# Patient Record
Sex: MALE | Race: WHITE | NOT HISPANIC OR LATINO | URBAN - METROPOLITAN AREA
[De-identification: names, ages, dates, MRNs, and addresses within clinical notes are randomized per-mention and may not be internally consistent; named-entity substitution may affect disease eponyms.]

---

## 2017-04-23 ENCOUNTER — INPATIENT (INPATIENT)
Facility: HOSPITAL | Age: 72
LOS: 0 days | Discharge: HOME | End: 2017-04-24
Attending: INTERNAL MEDICINE | Admitting: INTERNAL MEDICINE
Payer: MEDICARE

## 2017-04-23 VITALS — HEIGHT: 67 IN | WEIGHT: 132.06 LBS

## 2017-04-23 LAB
ALBUMIN SERPL ELPH-MCNC: 4 G/DL — SIGNIFICANT CHANGE UP (ref 3.3–5)
ALP SERPL-CCNC: 62 U/L — SIGNIFICANT CHANGE UP (ref 40–120)
ALT FLD-CCNC: 38 U/L — SIGNIFICANT CHANGE UP (ref 12–78)
ANION GAP SERPL CALC-SCNC: 7 MMOL/L — SIGNIFICANT CHANGE UP (ref 5–17)
APPEARANCE UR: CLEAR — SIGNIFICANT CHANGE UP
APTT BLD: 34.9 SEC — SIGNIFICANT CHANGE UP (ref 27.5–37.4)
AST SERPL-CCNC: 22 U/L — SIGNIFICANT CHANGE UP (ref 15–37)
BASOPHILS # BLD AUTO: 0 K/UL — SIGNIFICANT CHANGE UP (ref 0–0.2)
BASOPHILS NFR BLD AUTO: 0.4 % — SIGNIFICANT CHANGE UP (ref 0–2)
BILIRUB SERPL-MCNC: 0.5 MG/DL — SIGNIFICANT CHANGE UP (ref 0.2–1.2)
BILIRUB UR-MCNC: NEGATIVE — SIGNIFICANT CHANGE UP
BUN SERPL-MCNC: 15 MG/DL — SIGNIFICANT CHANGE UP (ref 7–23)
CALCIUM SERPL-MCNC: 9.6 MG/DL — SIGNIFICANT CHANGE UP (ref 8.5–10.1)
CHLORIDE SERPL-SCNC: 105 MMOL/L — SIGNIFICANT CHANGE UP (ref 96–108)
CO2 SERPL-SCNC: 27 MMOL/L — SIGNIFICANT CHANGE UP (ref 22–31)
COLOR SPEC: YELLOW — SIGNIFICANT CHANGE UP
CREAT SERPL-MCNC: 0.94 MG/DL — SIGNIFICANT CHANGE UP (ref 0.5–1.3)
DIFF PNL FLD: NEGATIVE — SIGNIFICANT CHANGE UP
EOSINOPHIL # BLD AUTO: 0 K/UL — SIGNIFICANT CHANGE UP (ref 0–0.5)
EOSINOPHIL NFR BLD AUTO: 0.2 % — SIGNIFICANT CHANGE UP (ref 0–6)
GLUCOSE SERPL-MCNC: 112 MG/DL — HIGH (ref 70–99)
GLUCOSE UR QL: NEGATIVE MG/DL — SIGNIFICANT CHANGE UP
HCT VFR BLD CALC: 39 % — SIGNIFICANT CHANGE UP (ref 39–50)
HGB BLD-MCNC: 12.6 G/DL — LOW (ref 13–17)
INR BLD: 1.09 RATIO — SIGNIFICANT CHANGE UP (ref 0.88–1.16)
KETONES UR-MCNC: NEGATIVE — SIGNIFICANT CHANGE UP
LEUKOCYTE ESTERASE UR-ACNC: NEGATIVE — SIGNIFICANT CHANGE UP
LYMPHOCYTES # BLD AUTO: 1.1 K/UL — SIGNIFICANT CHANGE UP (ref 1–3.3)
LYMPHOCYTES # BLD AUTO: 11.6 % — LOW (ref 13–44)
MCHC RBC-ENTMCNC: 28.1 PG — SIGNIFICANT CHANGE UP (ref 27–34)
MCHC RBC-ENTMCNC: 32.2 GM/DL — SIGNIFICANT CHANGE UP (ref 32–36)
MCV RBC AUTO: 87.2 FL — SIGNIFICANT CHANGE UP (ref 80–100)
MONOCYTES # BLD AUTO: 0.4 K/UL — SIGNIFICANT CHANGE UP (ref 0–0.9)
MONOCYTES NFR BLD AUTO: 3.7 % — SIGNIFICANT CHANGE UP (ref 2–14)
NEUTROPHILS # BLD AUTO: 8.2 K/UL — HIGH (ref 1.8–7.4)
NEUTROPHILS NFR BLD AUTO: 84.1 % — HIGH (ref 43–77)
NITRITE UR-MCNC: NEGATIVE — SIGNIFICANT CHANGE UP
PH UR: 6 — SIGNIFICANT CHANGE UP (ref 5–8)
PLATELET # BLD AUTO: 309 K/UL — SIGNIFICANT CHANGE UP (ref 150–400)
POTASSIUM SERPL-MCNC: 4.2 MMOL/L — SIGNIFICANT CHANGE UP (ref 3.5–5.3)
POTASSIUM SERPL-SCNC: 4.2 MMOL/L — SIGNIFICANT CHANGE UP (ref 3.5–5.3)
PROT SERPL-MCNC: 7.2 GM/DL — SIGNIFICANT CHANGE UP (ref 6–8.3)
PROT UR-MCNC: NEGATIVE MG/DL — SIGNIFICANT CHANGE UP
PROTHROM AB SERPL-ACNC: 11.8 SEC — SIGNIFICANT CHANGE UP (ref 9.8–12.7)
RBC # BLD: 4.47 M/UL — SIGNIFICANT CHANGE UP (ref 4.2–5.8)
RBC # FLD: 12.7 % — SIGNIFICANT CHANGE UP (ref 10.3–14.5)
SODIUM SERPL-SCNC: 139 MMOL/L — SIGNIFICANT CHANGE UP (ref 135–145)
SP GR SPEC: 1.01 — SIGNIFICANT CHANGE UP (ref 1.01–1.02)
UROBILINOGEN FLD QL: NEGATIVE MG/DL — SIGNIFICANT CHANGE UP
WBC # BLD: 9.8 K/UL — SIGNIFICANT CHANGE UP (ref 3.8–10.5)
WBC # FLD AUTO: 9.8 K/UL — SIGNIFICANT CHANGE UP (ref 3.8–10.5)

## 2017-04-23 PROCEDURE — 93010 ELECTROCARDIOGRAM REPORT: CPT

## 2017-04-23 PROCEDURE — 70450 CT HEAD/BRAIN W/O DYE: CPT | Mod: 26

## 2017-04-23 PROCEDURE — 73080 X-RAY EXAM OF ELBOW: CPT | Mod: 26,RT

## 2017-04-23 PROCEDURE — 72125 CT NECK SPINE W/O DYE: CPT | Mod: 26

## 2017-04-23 PROCEDURE — 74176 CT ABD & PELVIS W/O CONTRAST: CPT | Mod: 26

## 2017-04-23 PROCEDURE — 71010: CPT | Mod: 26

## 2017-04-23 PROCEDURE — 71250 CT THORAX DX C-: CPT | Mod: 26

## 2017-04-23 PROCEDURE — 99285 EMERGENCY DEPT VISIT HI MDM: CPT

## 2017-04-23 RX ORDER — ACETAMINOPHEN 500 MG
650 TABLET ORAL EVERY 4 HOURS
Qty: 0 | Refills: 0 | Status: DISCONTINUED | OUTPATIENT
Start: 2017-04-23 | End: 2017-04-24

## 2017-04-23 RX ORDER — TIZANIDINE 4 MG/1
0 TABLET ORAL
Qty: 0 | Refills: 0 | COMMUNITY

## 2017-04-23 RX ORDER — OXYCODONE HYDROCHLORIDE 5 MG/1
5 TABLET ORAL ONCE
Qty: 0 | Refills: 0 | Status: DISCONTINUED | OUTPATIENT
Start: 2017-04-23 | End: 2017-04-23

## 2017-04-23 RX ORDER — ENOXAPARIN SODIUM 100 MG/ML
40 INJECTION SUBCUTANEOUS EVERY 24 HOURS
Qty: 0 | Refills: 0 | Status: DISCONTINUED | OUTPATIENT
Start: 2017-04-23 | End: 2017-04-24

## 2017-04-23 RX ORDER — TIZANIDINE 4 MG/1
4 TABLET ORAL AT BEDTIME
Qty: 0 | Refills: 0 | Status: DISCONTINUED | OUTPATIENT
Start: 2017-04-23 | End: 2017-04-24

## 2017-04-23 RX ORDER — OXYCODONE HYDROCHLORIDE 5 MG/1
5 TABLET ORAL EVERY 4 HOURS
Qty: 0 | Refills: 0 | Status: DISCONTINUED | OUTPATIENT
Start: 2017-04-23 | End: 2017-04-24

## 2017-04-23 RX ORDER — TRAVOPROST 0.04 MG/ML
1 SOLUTION/ DROPS OPHTHALMIC
Qty: 0 | Refills: 0 | COMMUNITY

## 2017-04-23 RX ORDER — MORPHINE SULFATE 50 MG/1
2 CAPSULE, EXTENDED RELEASE ORAL EVERY 4 HOURS
Qty: 0 | Refills: 0 | Status: DISCONTINUED | OUTPATIENT
Start: 2017-04-23 | End: 2017-04-24

## 2017-04-23 RX ORDER — ONDANSETRON 8 MG/1
4 TABLET, FILM COATED ORAL EVERY 6 HOURS
Qty: 0 | Refills: 0 | Status: DISCONTINUED | OUTPATIENT
Start: 2017-04-23 | End: 2017-04-24

## 2017-04-23 RX ORDER — TIMOLOL 0.5 %
1 DROPS OPHTHALMIC (EYE)
Qty: 0 | Refills: 0 | COMMUNITY

## 2017-04-23 RX ORDER — DOCUSATE SODIUM 100 MG
100 CAPSULE ORAL THREE TIMES A DAY
Qty: 0 | Refills: 0 | Status: DISCONTINUED | OUTPATIENT
Start: 2017-04-23 | End: 2017-04-24

## 2017-04-23 RX ORDER — TIMOLOL 0.5 %
1 DROPS OPHTHALMIC (EYE)
Qty: 0 | Refills: 0 | Status: DISCONTINUED | OUTPATIENT
Start: 2017-04-23 | End: 2017-04-24

## 2017-04-23 RX ORDER — IBUPROFEN 200 MG
400 TABLET ORAL EVERY 8 HOURS
Qty: 0 | Refills: 0 | Status: DISCONTINUED | OUTPATIENT
Start: 2017-04-23 | End: 2017-04-24

## 2017-04-23 RX ORDER — MORPHINE SULFATE 50 MG/1
4 CAPSULE, EXTENDED RELEASE ORAL ONCE
Qty: 0 | Refills: 0 | Status: DISCONTINUED | OUTPATIENT
Start: 2017-04-23 | End: 2017-04-23

## 2017-04-23 RX ADMIN — OXYCODONE HYDROCHLORIDE 5 MILLIGRAM(S): 5 TABLET ORAL at 13:52

## 2017-04-23 RX ADMIN — MORPHINE SULFATE 2 MILLIGRAM(S): 50 CAPSULE, EXTENDED RELEASE ORAL at 21:14

## 2017-04-23 RX ADMIN — ENOXAPARIN SODIUM 40 MILLIGRAM(S): 100 INJECTION SUBCUTANEOUS at 18:39

## 2017-04-23 RX ADMIN — MORPHINE SULFATE 4 MILLIGRAM(S): 50 CAPSULE, EXTENDED RELEASE ORAL at 10:30

## 2017-04-23 RX ADMIN — MORPHINE SULFATE 2 MILLIGRAM(S): 50 CAPSULE, EXTENDED RELEASE ORAL at 20:44

## 2017-04-23 RX ADMIN — Medication 650 MILLIGRAM(S): at 14:30

## 2017-04-23 RX ADMIN — Medication 1 DROP(S): at 18:41

## 2017-04-23 RX ADMIN — OXYCODONE HYDROCHLORIDE 5 MILLIGRAM(S): 5 TABLET ORAL at 14:30

## 2017-04-23 RX ADMIN — MORPHINE SULFATE 4 MILLIGRAM(S): 50 CAPSULE, EXTENDED RELEASE ORAL at 10:15

## 2017-04-23 RX ADMIN — Medication 650 MILLIGRAM(S): at 13:51

## 2017-04-23 NOTE — ED PROVIDER NOTE - PROGRESS NOTE DETAILS
Alphonso WALDEN: Dr. Menchaca has DW Dr. Estevez about pt's CT results. Dr. Melton state that due to the nature of pt's isolated rib fx with no complications pt does not meet trauma admission criteria. Alphonso WALDEN: Dr. Menchaca has DW pt about the results of CT scan and XR's. Pt states that he is requesting to be D/C'd home with family. Dr. Menchaca has explained criteria at which pt should return to the ED, pt and family state they understand. Nikolai DAVIS: As noted in mahendra's note (WIN Kim) did discuss with patient and family the findings of the Rt minimally displaced fx. Initially patient wished to leave (as noted by mahendra in above note dated 23 April 2017 at 13:22) however, patient changed mind after discussion with family due to pain. Went in on my own and spoke with patient again. Patient states [place] out of state and that he and family do not feel well to leave the hospital due to pain. Patient was admitted for further inpatient pain management, incentive spirometry treatment, and observation Dr. Cao's admission of this patient is appreciated. Dr. Estevez (Trauma surgery) evaluation of this patient is also appreciated. Patient did not require any immediate surgical intervention. I, Krzysztof Menchaca, utilized the scribe Alden Kim, for the documentations appearing in the progress note. I agree with mahendra's documentation done by me. Alphonso WALDEN: Dr. Menchaca has D/W pt about the results of CT scan and XR's. Pt states that he is requesting to be D/C'd home with family. Dr. Menchaca has explained criteria at which pt should return to the ED, pt and family state they understand.

## 2017-04-23 NOTE — H&P ADULT - ASSESSMENT
This is a 71 y.o M with PMH of  shunt, glaucoma otherwise in normal state of health admitted for acute pleuritic chest pain 2/2 mechanical fall resulting in acute right 8th rib fracture:     # Acute Pleuritic Chest Pain  # Rib Fracture  # Mechanical Fall  - no syncope or pre-syncopal symptoms. Will however check orthostatics.   - ambulatory without assistive devices at baseline, will consult PT prior to discharge.   - OOB with assistance   - evaluated by Trauma, no surgical intervention.   - admit to floor, non tele for pain control.   - encouraged incentive spirometer use, IV Morphine for severe pain otherwise prn Oxycodone and start standing NSAIDs q 8 hrs.     # Glaucoma  - continue home eye drops, Timolol and Travatan. Discussed with Pharmacy to approve nonformulary meds.     # History of  Shunt  - intact based on pan scan imaging.   - takes Tizanidine 4mg 2 tabs QHS for headaches, continue here.     DVT ppx: lovenox  Diet: regular  Dispo: admit to floor, nontele. Anticipate discharge in 1-2 days pending relief of symptoms.     Code Status: discussed with patient, DNR/DNI.     Total time 65 mins.

## 2017-04-23 NOTE — ED PROVIDER NOTE - OBJECTIVE STATEMENT
70 y/o M with Hx of Hydrocephalus with shunt placement, on no anticoagulants presents to ED for evaluation s/p fall. Pt states he was in the shower and slipped and fell out of it landing on his right side. Fall occurred 1 hour ago. No head injury, no LOC. He reports landing on his back , right elbow, and shoulder. He states that since the fall he has felt SOB with chest wall pain, along with numbness in his finger tips. Pt denies CP, n/v/d, incontinence.

## 2017-04-23 NOTE — H&P ADULT - HISTORY OF PRESENT ILLNESS
This is a pleasant 71y old  Male who presents with a chief complaint of post fall, rt chest pain that began ealier today. Patient is visiting his family here in [place] and permanently resides in [place]. As he was getting into the shower he slipped and fell hitting the right side of his body. He denies palpitations, chest pain, lightheadedness or prodromal symptoms. Past medical hx notable for  shunt with revisions originally placed in 1993 however he did not hit his head nor experienced LOC.     In the ED, CT chest reported minimally displaced right 8th rib fracture. Head CT stable with  shunt in place, no signs of CVA or midline shift. Patient evaluated by trauma team, no acute surgical interventions and medical admission requested for pain management as patient requiring IV pain meds for comfort. Current O2 sats stable on room air however describes pleuritic chest pain on the right.     ROS: negative for recent illnesses, neck pain or HA. All other 14 pt ROS is negative unless stated above.     PAST MEDICAL & SURGICAL HISTORY:  Hydrocephalus   shunt - with multiple revisions over the past several years, initially placed in 1993  Appendectomy  hernia repair    FH: unknown.   Social HX: Non smoker, no ETOH abuse. Lives at home in [place] with his wife and is fairly active, walks 1 mile a day. Reports occassional marijuana use for headaches but denies any other substance use.     Allergies: No Known Allergies      Vital Signs Last 24 Hrs  T(C): 36.9, Max: 36.9 (04-23 @ 15:17)  T(F): 98.5, Max: 98.5 (04-23 @ 15:17)  HR: 86 (55 - 86)  BP: 114/75 (114/75 - 138/73)  BP(mean): --  RR: 19 (18 - 19)  SpO2: 100% (99% - 100%)  PHYSICAL EXAM:    Constitutional: NAD, awake and alert, well-developed elderly male   HEENT: PERR, EOMI, Normal Hearing, MMM  Neck: Soft and supple, No LAD, No JVD  Respiratory: Breath sounds are clear bilaterally, No wheezing, rales or rhonchi however deep inspiration causes patient to grimace.  Cardiovascular: S1 and S2, regular rate and rhythm, no Murmurs, gallops or rubs  CHEST: tender right chest to palpation.   Gastrointestinal: Bowel Sounds present, soft, nontender, nondistended, no guarding, no rebound  Extremities: No peripheral edema  Vascular: 2+ peripheral pulses  Neurological: A/O x 3, no focal deficits  Musculoskeletal: 5/5 strength b/l upper and lower extremities  Skin: No rashes      MEDICATIONS  (STANDING):  ibuprofen  Tablet 400milliGRAM(s) Oral every 8 hours  tiZANidine 4milliGRAM(s) Oral at bedtime  freetext medication  -  Both EYES at bedtime  timolol 0.5% Solution 1Drop(s) Right EYE two times a day  enoxaparin Injectable 40milliGRAM(s) SubCutaneous every 24 hours      LABS: All Labs Reviewed:                        12.6   9.8   )-----------( 309      ( 23 Apr 2017 10:07 )             39.0     04-23    139  |  105  |  15  ----------------------------<  112<H>  4.2   |  27  |  0.94    Ca    9.6      23 Apr 2017 10:07    TPro  7.2  /  Alb  4.0  /  TBili  0.5  /  DBili  x   /  AST  22  /  ALT  38  /  AlkPhos  62  04-23    PT/INR - ( 23 Apr 2017 10:07 )   PT: 11.8 sec;   INR: 1.09 ratio        CT Abd/pelvis: IMPRESSION:   Minimally displaced fracture of the right lateral eighth rib.  Rest of   the chronic findings as above.    CT Head and C/spine: IMPRESSION:   No vertebral fracture is recognized. Chronic changes of the   cervical spine as above.    EKG: sinus bradycardia HR 55, T wave flattening in lateral leads otherwise unremarkable.

## 2017-04-23 NOTE — CONSULT NOTE ADULT - ASSESSMENT
71 y old male with  shunt post fall, Rt 8 th  rib fracture    Pain control   Incentive spirometry  Medicine consult for possible syncope work up, no trauma surgery intervention.

## 2017-04-23 NOTE — ED PROVIDER NOTE - LAB INTERPRETATION
CBC   WBC 9.8 K/uL, RBC 4.47 M/uL, HGB 12.6 L g/dL, HCT 39%, MCV 87.2  fl,  MCH 28.1 pg ,MCHC 32.2 gm/dL, RDW 12.7 %, K/uL  NEUT# 8.2 H. LYMPH# 1.1 K/uL, MONO# 0.4 K/uL, EOS# 0, BASO# 9, NEUT% f 84.1 H. LYMPH% 11.6 L, MONO% 3.7, EOS% 0.2, BASO% 0.4

## 2017-04-23 NOTE — ED ADULT NURSE NOTE - CHPI ED SYMPTOMS NEG
no fever/no numbness/no tingling/no abrasion/no loss of consciousness/no bleeding/no weakness/no vomiting/no confusion/no deformity

## 2017-04-23 NOTE — ED PROVIDER NOTE - CARE PLAN
Principal Discharge DX:	Closed fracture of one rib of right side, initial encounter  Secondary Diagnosis:	Closed head injury, initial encounter

## 2017-04-23 NOTE — CONSULT NOTE ADULT - SUBJECTIVE AND OBJECTIVE BOX
Patient is a 71y old  Male who presents with a chief complaint of post fall, rt chest pain    HPI:  71 y old male S/p fall in shower, no clear syncope, has hydrocephalus with shunt placement, he fell, hit his right side of body ,no LOC, no nec kpain. ,C/o left arm numbness, and left chest pain, mild SOB, O2 sat stable, No  abdominal pain, Moving all extremities.    ROS: as above    PAST MEDICAL & SURGICAL HISTORY:      FH: NC  Socail HX: Non , smoker, no ETOH abuse    Allergies    No Known Allergies    Vital Signs Last 24 Hrs  T(C): 36.4, Max: 36.4 (04-23 @ 09:28)  T(F): 97.6, Max: 97.6 (04-23 @ 09:28)  HR: 55 (55 - 55)  BP: 138/73 (138/73 - 138/73)  BP(mean): --  RR: 19 (19 - 19)  SpO2: 100% (100% - 100%)    PHYSICAL EXAM:      Constitutional: NAD    General:  AOx3, GCS: 15/15, reactive , equal pupils.    Respiratory:  CTABL    Cardiovascular: S1+S2+0    Gastrointestinal : Abdomen soft, non distended, NT, dressing and incision CDI.    Extremities: NV intact    Neurological: No focal deficit.    Labs:                          12.6   9.8   )-----------( 309      ( 23 Apr 2017 10:07 )             39.0       04-23    139  |  105  |  15  ----------------------------<  112<H>  4.2   |  27  |  0.94    Ca    9.6      23 Apr 2017 10:07    TPro  7.2  /  Alb  4.0  /  TBili  0.5  /  DBili  x   /  AST  22  /  ALT  38  /  AlkPhos  62  04-23      PT/INR - ( 23 Apr 2017 10:07 )   PT: 11.8 sec;   INR: 1.09 ratio         PTT - ( 23 Apr 2017 10:07 )  PTT:34.9 sec    Radiology results:    CT chest /Abd  IMPRESSION:   Minimally displaced fracture of the right lateral eighth rib.  Rest of   the chronic findings as above.    Ct head/ C spine:      IMPRESSION:   No vertebral fracture is recognized. Chronic changes of the   cervical spine as above.    MPRESSION:       No evidence of acute intracranial abnormality.  No evidence of   hemorrhage.  here is a left frontal approach ventricular catheter with tip near the   septum pellucidum. There is a small amount of insufflation/gliosis within   the left anterior frontal lobe, along the catheter tract. Ventricles are   mildly enlarged, particularly the bifrontal horns..   There is no CT   evidence for acute cerebral cortical infarct. There is no evidence of   hemorrhage. No mass effect is found in the brain.  There is no midline   shift or herniation pattern. 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 Patient is a 71y old  Male who presents with a chief complaint of post fall, rt chest pain    HPI:  71 y old male S/p fall in shower, no clear syncope, has hydrocephalus with shunt placement, he fell, hit his right side of body ,no LOC, no nec kpain. ,C/o left arm numbness, and left chest pain, mild SOB, O2 sat stable, No  abdominal pain, Moving all extremities.    ROS: as above    PAST MEDICAL & SURGICAL HISTORY:  Hydrocephalus   shunt  Appendectomy  hernia repair    FH: NC  Social HX: Non , smoker, no ETOH abuse    Allergies    No Known Allergies    Vital Signs Last 24 Hrs  T(C): 36.4, Max: 36.4 (04-23 @ 09:28)  T(F): 97.6, Max: 97.6 (04-23 @ 09:28)  HR: 55 (55 - 55)  BP: 138/73 (138/73 - 138/73)  BP(mean): --  RR: 19 (19 - 19)  SpO2: 100% (100% - 100%)    PHYSICAL EXAM:      Constitutional: NAD    General:  AOx3, GCS: 15/15, reactive , equal pupils.    Respiratory:  CTABL Rt chest tenderness    Cardiovascular: S1+S2+0    Gastrointestinal : Abdomen soft, non distended, NT, dressing and incision CDI.    Extremities: NV intact    Neurological: No focal deficit.    Labs:                          12.6   9.8   )-----------( 309      ( 23 Apr 2017 10:07 )             39.0       04-23    139  |  105  |  15  ----------------------------<  112<H>  4.2   |  27  |  0.94    Ca    9.6      23 Apr 2017 10:07    TPro  7.2  /  Alb  4.0  /  TBili  0.5  /  DBili  x   /  AST  22  /  ALT  38  /  AlkPhos  62  04-23      PT/INR - ( 23 Apr 2017 10:07 )   PT: 11.8 sec;   INR: 1.09 ratio         PTT - ( 23 Apr 2017 10:07 )  PTT:34.9 sec    Radiology results:    CT chest /Abd  IMPRESSION:   Minimally displaced fracture of the right lateral eighth rib.  Rest of   the chronic findings as above.    Ct head/ C spine:      IMPRESSION:   No vertebral fracture is recognized. Chronic changes of the   cervical spine as above.    MPRESSION:       No evidence of acute intracranial abnormality.  No evidence of   hemorrhage.  here is a left frontal approach ventricular catheter with tip near the   septum pellucidum. There is a small amount of insufflation/gliosis within   the left anterior frontal lobe, along the catheter tract. Ventricles are   mildly enlarged, particularly the bifrontal horns..   There is no CT   evidence for acute cerebral cortical infarct. There is no evidence of   hemorrhage. No mass effect is found in the brain.  There is no midline   shift or herniation pattern. 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

## 2017-04-23 NOTE — PATIENT PROFILE ADULT. - VISION (WITH CORRECTIVE LENSES IF THE PATIENT USUALLY WEARS THEM):
Partially impaired: cannot see medication labels or newsprint, but can see obstacles in path, and the surrounding layout; can count fingers at arm's length/Bifocals with pt

## 2017-04-23 NOTE — ED ADULT NURSE NOTE - OBJECTIVE STATEMENT
Pt is a71y male, A & O x4, VSS, presents to ED s/p fall in shower from standing height, unwitnessed, pt denies dizziness, weakness, chest pain, SOB, denies hitting head, denies LOC, neuro's intact, + pulse, motor & sensation x 4, no obvious bruising/swelling or deformity seen, pt c/o right shoulder & elbow pain, bed rails up, will continue to monitor. Pt is a71y male, A & O x4, VSS, presents to ED s/p fall in shower from standing height, unwitnessed, pt denies dizziness, weakness, chest pain, SOB, denies hitting head, denies LOC, neuro's intact, + pulse, motor & sensation x 4, no obvious bruising/swelling or deformity seen, pt c/o right chest & pain, incrases w/ breathing,  bed rails up, will continue to monitor.

## 2017-04-23 NOTE — ED PROVIDER NOTE - NS ED MD SCRIBE ATTENDING SCRIBE SECTIONS
PROGRESS NOTE/HISTORY OF PRESENT ILLNESS/PHYSICAL EXAM/DISPOSITION/PAST MEDICAL/SURGICAL/SOCIAL HISTORY/RESULTS/VITAL SIGNS( Pullset)/REVIEW OF SYSTEMS

## 2017-04-23 NOTE — ED PROVIDER NOTE - MEDICAL DECISION MAKING DETAILS
70 y/o Hx of  shunt presents s/p fall, plan for CT and XR. Nikolai DAVIS: Patient's CT's discussed with patient. Trauma rejected patient from surgical intervention. Trauma's evaluation is appreciated. Patient admitted for pain management and inpatient incentive spirometry treatment and observation. Hospitalist admission of the patient is appreciated.

## 2017-04-24 VITALS
DIASTOLIC BLOOD PRESSURE: 71 MMHG | RESPIRATION RATE: 16 BRPM | OXYGEN SATURATION: 98 % | TEMPERATURE: 98 F | SYSTOLIC BLOOD PRESSURE: 120 MMHG | HEART RATE: 62 BPM

## 2017-04-24 PROCEDURE — 71010: CPT | Mod: 26

## 2017-04-24 PROCEDURE — 73130 X-RAY EXAM OF HAND: CPT | Mod: 26,LT

## 2017-04-24 RX ORDER — POLYETHYLENE GLYCOL 3350 17 G/17G
17 POWDER, FOR SOLUTION ORAL ONCE
Qty: 0 | Refills: 0 | Status: COMPLETED | OUTPATIENT
Start: 2017-04-24 | End: 2017-04-24

## 2017-04-24 RX ORDER — IBUPROFEN 200 MG
1 TABLET ORAL
Qty: 30 | Refills: 0 | OUTPATIENT
Start: 2017-04-24

## 2017-04-24 RX ORDER — LATANOPROST 0.05 MG/ML
1 SOLUTION/ DROPS OPHTHALMIC; TOPICAL AT BEDTIME
Qty: 0 | Refills: 0 | Status: DISCONTINUED | OUTPATIENT
Start: 2017-04-24 | End: 2017-04-24

## 2017-04-24 RX ADMIN — Medication 1 DROP(S): at 05:59

## 2017-04-24 RX ADMIN — OXYCODONE HYDROCHLORIDE 5 MILLIGRAM(S): 5 TABLET ORAL at 04:30

## 2017-04-24 RX ADMIN — Medication 100 MILLIGRAM(S): at 11:14

## 2017-04-24 RX ADMIN — MORPHINE SULFATE 2 MILLIGRAM(S): 50 CAPSULE, EXTENDED RELEASE ORAL at 05:58

## 2017-04-24 RX ADMIN — OXYCODONE HYDROCHLORIDE 5 MILLIGRAM(S): 5 TABLET ORAL at 04:00

## 2017-04-24 RX ADMIN — POLYETHYLENE GLYCOL 3350 17 GRAM(S): 17 POWDER, FOR SOLUTION ORAL at 11:14

## 2017-04-24 NOTE — DISCHARGE NOTE ADULT - PLAN OF CARE
Pain control Follow up with your primary care physician within 1 week of discharge. Take pain meds as needed. No surgery required.

## 2017-04-24 NOTE — DISCHARGE NOTE ADULT - PATIENT PORTAL LINK FT
“You can access the [facility] Patient Portal, offered by [facility], by registering with the following website: [email or web address]”

## 2017-04-24 NOTE — DISCHARGE NOTE ADULT - VISION (WITH CORRECTIVE LENSES IF THE PATIENT USUALLY WEARS THEM):
Bifocals with pt/Partially impaired: cannot see medication labels or newsprint, but can see obstacles in path, and the surrounding layout; can count fingers at arm's length

## 2017-04-24 NOTE — CONSULT NOTE ADULT - SUBJECTIVE AND OBJECTIVE BOX
HPI  71M admitted for fall complains of sudden onset left thumb locked in extension yesterday night. Patient reports he was unable to bend his thumb briefly, and then could spontaneously bend the thumb after a loud "pop" heard, followed by pain for approximately one hour in the thumb. On exam, patient denies any pain in the hand. Pt denies numbness, paresthesias, trauma since admission, or any other signs/symptoms at this time. Pt refused further XRs.    Allergies: NKDA  PMH: Hydrocephalus  PSH: VPS, appy, hernia repair  Social: Denies smoking  FH: Noncontributory  Imaging: XR L hand: no acute fx/dx., thumb IPJ incompletely visualized.    Physical exam  VS: Afebrile, vital signs stable  Gen: NAD  left UE: Skin intact. No erythema/ecchymosis/warmth. No TTP bony prominences at Shoulder/Elbow/Hand/Fingers with full painless AROM at baseline per patient, Full active thumb flexion/extension/abduction/adduction/opposition without pain, locking, or catching. Thumb varus/valgus stable. +AIN/PIN/MN/RN/UN/MCN/AxN. SILT C5-T1. +RA, capillary refill brisk. Compartments soft and nontender.  Secondary Survey: Mild TTP right chest wall. Full painless AROM appendicular skeleton at baseline per patient. SILT. Capillary refill brisk. Able to BL SLR. Negative BL log roll. No TTP axial spine. HPI  71M admitted for fall complains of sudden onset left thumb locked in extension yesterday night. Patient reports he was unable to bend his thumb briefly, and then could spontaneously bend the thumb after a loud "pop" heard, followed by pain for approximately one hour in the thumb. Patient states this is the first time his thumb has locked. On exam, patient denies any pain in the hand. Pt denies numbness, paresthesias, trauma since admission, or any other signs/symptoms at this time. Pt refused further XRs.    Allergies: NKDA  PMH: Hydrocephalus  PSH: VPS, appy, hernia repair  Social: Denies smoking  FH: Noncontributory  Imaging: XR L hand: no acute fracture/dislocation, arthritic changes present at thumb IP joint.    Physical exam  VS: Afebrile, vital signs stable  Gen: NAD  left UE: Skin intact. No erythema/ecchymosis/warmth. No TTP bony prominences at Shoulder/Elbow/Hand/Fingers with full painless AROM at baseline per patient, Full active thumb flexion/extension/abduction/adduction/opposition without pain, locking, or catching. Thumb varus/valgus stable. +AIN/PIN/MN/RN/UN/MCN/AxN. SILT C5-T1. +RA, capillary refill brisk. Compartments soft and nontender.  Secondary Survey: Mild TTP right chest wall. Full painless AROM appendicular skeleton at baseline per patient. SILT. Capillary refill brisk. Able to BL SLR. Negative BL log roll. No TTP axial spine.

## 2017-04-24 NOTE — DISCHARGE NOTE ADULT - MEDICATION SUMMARY - MEDICATIONS TO TAKE
I will START or STAY ON the medications listed below when I get home from the hospital:    Percocet 5/325 oral tablet  -- 1 tab(s) by mouth every 4 to 6 hours, As Needed -for severe pain MDD:4  -- Caution federal law prohibits the transfer of this drug to any person other  than the person for whom it was prescribed.  May cause drowsiness.  Alcohol may intensify this effect.  Use care when operating dangerous machinery.  This prescription cannot be refilled.  This product contains acetaminophen.  Do not use  with any other product containing acetaminophen to prevent possible liver damage.  Using more of this medication than prescribed may cause serious breathing problems.    -- Indication: For Severe rib pain     ibuprofen 800 mg oral tablet  -- 1 tab(s) by mouth every 8 hours, As Needed moderate pain.   -- Do not take this drug if you are pregnant.  It is very important that you take or use this exactly as directed.  Do not skip doses or discontinue unless directed by your doctor.  May cause drowsiness or dizziness.  Obtain medical advice before taking any non-prescription drugs as some may affect the action of this medication.  Take with food or milk.    -- Indication: For Moderate pain    tiZANidine  --  by mouth   -- Indication: For headaches    timolol hemihydrate 0.5% ophthalmic solution  -- 1 drop(s) to RIGHT affected eye 2 times a day  -- Indication: For glaucoma    Travatan 0.004% ophthalmic solution  -- 1 drop(s) to each affected eye once a day (in the evening)  -- Indication: For glaucoma

## 2017-04-24 NOTE — DISCHARGE NOTE ADULT - ADDITIONAL INSTRUCTIONS
Xray of your thumb showed arthritis. CT scan of your head showed stable  shunt, no evidence of new stroke.   Follow up with PCP after discharge.

## 2017-04-24 NOTE — PHYSICAL THERAPY INITIAL EVALUATION ADULT - GENERAL OBSERVATIONS, REHAB EVAL
The pt was received on 2N, pleasant and cooperative, supine with the right UE held in a guarded position. The pt agreed to ambulate and participate in PT eval.

## 2017-04-24 NOTE — PHYSICAL THERAPY INITIAL EVALUATION ADULT - PERTINENT HX OF CURRENT PROBLEM, REHAB EVAL
The pt reports that [place] state and came to visit his son and had a slip and fall resulting in a fx 8th right rib. The pt also reports having hx of  shunt/ hydrocephalus and chronic headaches which have been an issue for a long time and continue to persist.

## 2017-04-24 NOTE — PROGRESS NOTE ADULT - ASSESSMENT
A/P:  Right 8th rib fracture  S/P fall   shunt  Incentive spirometry  Advise appropriate GI/DVT prophylaxis  Medical management per primary service  No acute trauma surgical intervention  Reconsult prn surgical needs

## 2017-04-24 NOTE — DISCHARGE NOTE ADULT - CARE PLAN
Principal Discharge DX:	Closed fracture of one rib of right side, initial encounter  Goal:	Pain control  Instructions for follow-up, activity and diet:	Follow up with your primary care physician within 1 week of discharge. Take pain meds as needed. No surgery required.

## 2017-04-24 NOTE — DISCHARGE NOTE ADULT - HOSPITAL COURSE
Chief Complaint/Reason for Admission: Fall in shower, rib pain	  History of Present Illness: 	  This is a pleasant 71y old  Male who presents with a chief complaint of post fall, rt chest pain. Patient is visiting his family here in [place] and permanently resides in [place]. As he was getting into the shower he slipped and fell hitting the right side of his body. He denies palpitations, chest pain, lightheadedness or prodromal symptoms. Past medical hx notable for  shunt with revisions originally placed in 1993 however he did not hit his head nor experienced LOC.     In the ED, CT chest reported minimally displaced right 8th rib fracture. Head CT stable with  shunt in place, no signs of CVA or midline shift. Patient evaluated by trauma team, no acute surgical interventions and medical admission requested for pain management as patient requiring IV pain meds for comfort. Current O2 sats stable on room air however describes pleuritic chest pain on the right.     4/24/17: Doing well, no PT needs upon eval. Pain controlled. + mild constipation. Eager to go home.   ROS: stated above.     Vital Signs Last 24 Hrs  T(C): 36.9, Max: 37.1 (04-23 @ 23:19)  T(F): 98.5, Max: 98.7 (04-23 @ 23:19)  HR: 62 (62 - 86)  BP: 120/71 (105/64 - 137/82)  BP(mean): --  RR: 16 (16 - 19)  SpO2: 98% (97% - 100%)    PE:  Constitutional: NAD, awake and alert, well-developed elderly male   HEENT: PERR, EOMI, Normal Hearing, MMM  Neck: Soft and supple, No LAD, No JVD  Respiratory: Breath sounds are clear bilaterally, No wheezing, rales or rhonchi.   Cardiovascular: S1 and S2, regular rate and rhythm, no Murmurs, gallops or rubs  CHEST: tender right chest to palpation slightly decreased.   Gastrointestinal: Bowel Sounds present, soft, nontender, nondistended, no guarding, no rebound  Extremities: No peripheral edema  Vascular: 2+ peripheral pulses  Neurological: A/O x 3, no focal deficits  Musculoskeletal: 5/5 strength b/l upper and lower extremities  Skin: No rashes    CT Abd/pelvis: IMPRESSION:   Minimally displaced fracture of the right lateral eighth rib.  Rest of   the chronic findings as above.    CT Head and C/spine: IMPRESSION:   No vertebral fracture is recognized. Chronic changes of the   cervical spine as above.    EKG: sinus bradycardia HR 55, T wave flattening in lateral leads otherwise unremarkable.    · Assessment		  This is a 71 y.o M with PMH of  shunt, glaucoma otherwise in normal state of health admitted for acute pleuritic chest pain 2/2 mechanical fall resulting in acute right 8th rib fracture:     # Acute Pleuritic Chest Pain  # Rib Fracture  # Mechanical Fall  - no syncope or pre-syncopal symptoms. Orthostatics negative, would still encourage oral intake of fluids.   - PT eval done, no acute needs.   - evaluated by Trauma, no surgical intervention.   - will dc on small amount of Percocet for pain control and NSAIDS.   - f/u with PCP in 1 week.     # Glaucoma  - continue home eye drops, Timolol and Travatan.     # History of  Shunt  - intact based on pan scan imaging.   - takes Tizanidine 4mg 2 tabs QHS for headaches, continue here.     # Thumb Pain  - Xray with DJD, no acute fracture. If pain persists should get repeat imaging in 2 weeks.     # Constipation  - last BM yesterday AM, give miralax now.     Discharge today.     Code Status: discussed with patient, DNR/DNI.   Total time > 35 mins.

## 2017-04-24 NOTE — PROGRESS NOTE ADULT - SUBJECTIVE AND OBJECTIVE BOX
CC:Patient is a 71y old  Male who presents with a chief complaint of Fall in shower, rib pain (24 Apr 2017 12:06)      Subjective:  Pt seen and examined at bedside with chaperone. Pt is AAOx3, pt in no acute distress. pt states well tolerated rib/chest pain with meds. Pt denied c/o fever, chills, SOB, abd pain, N/V/D, extremity pain or dysfunction, hemoptysis, hematemesis, hematuria, hematochexia, headache, diplopia, vertigo, dizzyness. Pt tolerating diet, (+) void, (+) ambulation, (+) bowel function    ROS:  chest pain otherwise negative ROS    Vital Signs Last 24 Hrs  T(C): 36.9, Max: 37.1 (04-23 @ 23:19)  T(F): 98.5, Max: 98.7 (04-23 @ 23:19)  HR: 62 (62 - 86)  BP: 120/71 (105/64 - 137/82)  BP(mean): --  RR: 16 (16 - 19)  SpO2: 98% (97% - 100%)    Labs:                      12.6   9.8   )-----------( 309      ( 23 Apr 2017 10:07 )             39.0     CBC Full  -  ( 23 Apr 2017 10:07 )  WBC Count : 9.8 K/uL  Hemoglobin : 12.6 g/dL  Hematocrit : 39.0 %  Platelet Count - Automated : 309 K/uL  Mean Cell Volume : 87.2 fl  Mean Cell Hemoglobin : 28.1 pg  Mean Cell Hemoglobin Concentration : 32.2 gm/dL  Auto Neutrophil # : 8.2 K/uL  Auto Lymphocyte # : 1.1 K/uL  Auto Monocyte # : 0.4 K/uL  Auto Eosinophil # : 0.0 K/uL  Auto Basophil # : 0.0 K/uL  Auto Neutrophil % : 84.1 %  Auto Lymphocyte % : 11.6 %  Auto Monocyte % : 3.7 %  Auto Eosinophil % : 0.2 %  Auto Basophil % : 0.4 %    04-23    139  |  105  |  15  ----------------------------<  112<H>  4.2   |  27  |  0.94    Ca    9.6      23 Apr 2017 10:07    TPro  7.2  /  Alb  4.0  /  TBili  0.5  /  DBili  x   /  AST  22  /  ALT  38  /  AlkPhos  62  04-23    LIVER FUNCTIONS - ( 23 Apr 2017 10:07 )  Alb: 4.0 g/dL / Pro: 7.2 gm/dL / ALK PHOS: 62 U/L / ALT: 38 U/L / AST: 22 U/L / GGT: x           PT/INR - ( 23 Apr 2017 10:07 )   PT: 11.8 sec;   INR: 1.09 ratio         PTT - ( 23 Apr 2017 10:07 )  PTT:34.9 sec      Meds:  acetaminophen   Tablet. 650milliGRAM(s) Oral every 4 hours PRN  ibuprofen  Tablet 400milliGRAM(s) Oral every 8 hours  tiZANidine 4milliGRAM(s) Oral at bedtime  timolol 0.5% Solution 1Drop(s) Right EYE two times a day  morphine  - Injectable 2milliGRAM(s) IV Push every 4 hours PRN  oxyCODONE IR 5milliGRAM(s) Oral every 4 hours PRN  enoxaparin Injectable 40milliGRAM(s) SubCutaneous every 24 hours  ondansetron Injectable 4milliGRAM(s) IV Push every 6 hours PRN  docusate sodium 100milliGRAM(s) Oral three times a day PRN  latanoprost 0.005% Ophthalmic Solution 1Drop(s) Both EYES at bedtime      Radiology:  EXAM:  CT ABDOMEN AND PELVIS                            PROCEDURE DATE:  04/23/2017        INTERPRETATION:  CT OF THE CHEST, ABDOMEN AND PELVIS WITHOUT IV CONTRAST     CLINICAL INFORMATION: Right chest pain status post fall    TECHNIQUE: CT scan ofthe chest, abdomen, and pelvis was performed from   the thoracic inlet through the symphysis pubis without intravenous or   oral contrast.  Sagittal and coronal reformatted images provided.    COMPARISON: None.    FINDINGS:  Evaluation of the solid organs and vasculature limited in the absence of   intravenous contrast.    CHEST:    LUNGS, AIRWAYS, PLEURA: No pleural effusion or pneumothorax. No pulmonary   mass or consolidation. There are 2 subcentimeter pleural-based nodules   along the right major fissure, likely atelectatic. There is minimal   subsegmental atelectasis at the lung bases. There are a few scattered 1   to 2 mm nodules. No endobronchial lesion.      VESSELS: Mild aneurysmal dilatation of the ascending aorta measuring up   to 3.7 cm in diameter.  HEART: Normal size. No pericardial effusion.     MEDIASTINUM AND JANICE: Within normal limits.  CHEST WALL, AXILLA, LOWER NECK: There is a minimally displaced fracture   of the right lateral eighth rib.  There is a mild chronic appearing   compression fracture along the superior endplate of T11, and a minimal   chronic appearing compression fracture with degenerative Schmorl node   along the superior endplate of T7. There is a  shunt catheter within   the subcutaneous tissues of the left anterior chest wall entering the   left upper abdomen and terminating anterior to the bladder.    ABDOMEN/PELVIS:    LIVER:  7 mm hypoattenuating lesion within the lateral left hepatic lobe,   too small to characterize..  GALLBLADDER: Unremarkable.  BILIARY TREE: Unremarkable.  PANCREAS: Unremarkable.  SPLEEN: Unremarkable.  ADRENALS: Unremarkable.  KIDNEYS/URETERS: Unremarkable.    BOWEL:  No bowel obstruction or inflammatory process.   PERITONEUM/RETROPERITONEUM:  No ascites, free air or significant   lymphadenopathy.    BLADDER: Unremarkable.  REPRODUCTIVE ORGANS: Unremarkable.    VASCULATURE: Atherosclerotic changes.  ABDOMINAL WALL:  Unremarkable    OSSEOUS STRUCTURES:  The osseous structures are intact without evidence of fracture or   dislocation.    IMPRESSION:   Minimally displaced fracture of the right lateral eighth rib.  Rest of   the chronic findings as above.              WILL ALCOCER M.D., ATTENDING RADIOLOGIST  This document has been electronically signed. Apr 23 2017 11:27AM    Physical exam:  GCS of 15  Pt is aaox3  Pt in no acute distress  Airway is patent  Breathing is symmetric and unlabored  CN II-XII grossly intact  HEENT: normocephalic, OMAR, EOM wnl, no gross craniofacial bony patholgy to exam  Neck: No tracheal deviation, no JVD, no crepitus, no ecchymosis, no hematoma  Chest: No gross left rib or sternal pathology or tenderness to exam, no crepitus, no ecchymosis, no hematoma, (+) tenderness to right 8th rib region from known fracture pathology  Resp: CTAB  CVS: S1S2(+)  ABD: bowel sounds (+), soft, nontender, non distended, no rebound, no guarding, no rigidity, no pelvic instability to exam  EXT: no calf tenderness or edema to exam b/l, pt has good capillary refill in all digits. Sensoromotor function grossly intact, on VTE prophylaxis  Skin: no adverse skin changes to exam

## 2017-04-25 ENCOUNTER — EMERGENCY (EMERGENCY)
Facility: HOSPITAL | Age: 72
LOS: 0 days | Discharge: HOME | End: 2017-04-25
Attending: EMERGENCY MEDICINE | Admitting: EMERGENCY MEDICINE
Payer: MEDICARE

## 2017-04-25 VITALS
DIASTOLIC BLOOD PRESSURE: 70 MMHG | HEART RATE: 69 BPM | OXYGEN SATURATION: 100 % | SYSTOLIC BLOOD PRESSURE: 134 MMHG | RESPIRATION RATE: 16 BRPM | TEMPERATURE: 98 F

## 2017-04-25 VITALS — HEIGHT: 67 IN | WEIGHT: 132.06 LBS

## 2017-04-25 DIAGNOSIS — K59.03 DRUG INDUCED CONSTIPATION: ICD-10-CM

## 2017-04-25 DIAGNOSIS — R51 HEADACHE: ICD-10-CM

## 2017-04-25 DIAGNOSIS — G91.9 HYDROCEPHALUS, UNSPECIFIED: ICD-10-CM

## 2017-04-25 DIAGNOSIS — Z98.2 PRESENCE OF CEREBROSPINAL FLUID DRAINAGE DEVICE: ICD-10-CM

## 2017-04-25 PROCEDURE — 70450 CT HEAD/BRAIN W/O DYE: CPT | Mod: 26

## 2017-04-25 PROCEDURE — 99284 EMERGENCY DEPT VISIT MOD MDM: CPT

## 2017-04-25 RX ORDER — ONDANSETRON 8 MG/1
4 TABLET, FILM COATED ORAL ONCE
Qty: 0 | Refills: 0 | Status: COMPLETED | OUTPATIENT
Start: 2017-04-25 | End: 2017-04-25

## 2017-04-25 RX ORDER — ACETAMINOPHEN 500 MG
1000 TABLET ORAL ONCE
Qty: 0 | Refills: 0 | Status: COMPLETED | OUTPATIENT
Start: 2017-04-25 | End: 2017-04-25

## 2017-04-25 RX ORDER — DOCUSATE SODIUM 100 MG
100 CAPSULE ORAL ONCE
Qty: 0 | Refills: 0 | Status: COMPLETED | OUTPATIENT
Start: 2017-04-25 | End: 2017-04-25

## 2017-04-25 RX ADMIN — Medication 100 MILLIGRAM(S): at 10:44

## 2017-04-25 RX ADMIN — Medication 1000 MILLIGRAM(S): at 11:55

## 2017-04-25 RX ADMIN — ONDANSETRON 4 MILLIGRAM(S): 8 TABLET, FILM COATED ORAL at 10:44

## 2017-04-25 NOTE — ED ADULT NURSE NOTE - OBJECTIVE STATEMENT
72 y/o male c/o right rib pain s/p fall sunday morning. Pt also c/o right sided headache and  vomiting "randomly" this morning. Hx of hydrocephalous.

## 2017-04-25 NOTE — ED STATDOCS - NS ED MD SCRIBE ATTENDING SCRIBE SECTIONS
PHYSICAL EXAM/CONSULTATIONS/SHIFT CHANGE/HISTORY OF PRESENT ILLNESS/DISPOSITION/PAST MEDICAL/SURGICAL/SOCIAL HISTORY/RESULTS/REVIEW OF SYSTEMS/PROGRESS NOTE

## 2017-04-25 NOTE — ED ADULT TRIAGE NOTE - CHIEF COMPLAINT QUOTE
pt presents with rib pain and headache. pt was discharged from hospital yesterday afternoon for same problem.

## 2017-04-25 NOTE — ED STATDOCS - MEDICAL DECISION MAKING DETAILS
signed DEZ Hagan-SOO-    shunt at 1 (normal for pt). Chronic HA, pt plans to use his medical marijuana. OTC treatments for constipation. Pt feeling well, pt and family agree with DC and plan of care.

## 2017-04-25 NOTE — ED STATDOCS - PROGRESS NOTE DETAILS
signed Sarahi Gaytan PA-C Pt seen initially in intake by Dr Mckenzie.   Pt c/o right sided HA upon awakening this morning and vomited x1. Pt has HAs but states this one feels different. Pt seen yesterday in ED for fall, had pan scan, dx 1 rib fx. Hx chronic HAs,  shunt for hydrocephalus. Pt visiting from [place]. Neurosx Dr Anant Bustamante [phone], Neurologist Dr Geraldo Masters [phone]. signed Sarahi Gaytan PA-C   No acute findings on CT. Spoke to pts neurosurgery office, surgeon is out of town but last office note from 1/5/16 shows shunt at 1.0. Also spoke to Dr Masters, neurologist. Pt has hx chronic HAs which have been difficult to control. Pt reports he normally takes medical marijuana which is the only thing that helps for his headaches. Pt reports constipation, rectal exam in ED reveals small amount of soft stool in rectal vault. Pt declines enema in ED. Plan outpt laxatives/stool softeners, limit narcotics. Pt feeling well, pt and family agree with DC and plan of care. Pt ambulates with ease unassisted in ED.

## 2017-04-25 NOTE — ED STATDOCS - MUSCULOSKELETAL, MLM
range of motion is not limited and there is no muscle tenderness. range of motion is not limited. TTP anterior left ribs.

## 2017-04-25 NOTE — ED STATDOCS - DETAILS:
I, Eliezer Mckenzie, performed the initial face to face bedside interview with this patient regarding history of present illness, review of symptoms and relevant past medical, social and family history.  I completed an independent physical examination.  I was the initial provider who evaluated this patient. I have signed out the follow up of any pending tests (i.e. labs, radiological studies) to the ACP.  I have communicated the patient’s plan of care and disposition with the ACP.  The history, relevant review of systems, past medical and surgical history, medical decision making, and physical examination was documented by the scribe in my presence and I attest to the accuracy of the documentation.

## 2017-04-25 NOTE — ED STATDOCS - CARE PLAN
Principal Discharge DX:	Chronic nonintractable headache, unspecified headache type  Secondary Diagnosis:	Drug-induced constipation

## 2017-04-25 NOTE — ED STATDOCS - CONSTITUTIONAL, MLM
normal... well appearing, well nourished, and in no apparent distress. well appearing, well nourished, and in no apparent distress. awake, alert, oriented

## 2017-04-25 NOTE — ED STATDOCS - OBJECTIVE STATEMENT
70 y/o M with a hx of hydrocephalus ( shunt in place) presents to ED c/o 1 episode of vomiting this morning. +Constipation. Denies abd pain. Has no other constitutional complaints at this time. Was admitted to , d/c yesterday s/p fall in the shower; had a rib fracture otherwise negative workup, d/c with percocet. Denies having adverse reaction to percocet in the past when he has taken it. Denies blood thinners. PMD not local. pt lives in [place]. 72 y/o M with a hx of hydrocephalus ( shunt in place) presents to ED c/o 1 episode of vomiting this morning. +Constipation. Denies abd pain. Has no other constitutional complaints at this time. Was admitted to  on 4/23/17, d/c yesterday s/p fall in the shower; had a rib fracture otherwise negative workup, d/c with percocet. Denies having adverse reaction to percocet in the past when he has taken it. Denies blood thinners. PMD not local. pt lives in [place].
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
## 2017-04-26 DIAGNOSIS — R07.81 PLEURODYNIA: ICD-10-CM

## 2017-04-26 DIAGNOSIS — M65.312 TRIGGER THUMB, LEFT THUMB: ICD-10-CM

## 2017-04-26 DIAGNOSIS — Z66 DO NOT RESUSCITATE: ICD-10-CM
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
## 2017-04-26 DIAGNOSIS — H40.9 UNSPECIFIED GLAUCOMA: ICD-10-CM

## 2017-04-26 DIAGNOSIS — K59.00 CONSTIPATION, UNSPECIFIED: ICD-10-CM

## 2017-04-26 DIAGNOSIS — S22.31XA FRACTURE OF ONE RIB, RIGHT SIDE, INITIAL ENCOUNTER FOR CLOSED FRACTURE: ICD-10-CM

## 2017-04-26 DIAGNOSIS — G91.9 HYDROCEPHALUS, UNSPECIFIED: ICD-10-CM

## 2017-04-26 DIAGNOSIS — M19.042 PRIMARY OSTEOARTHRITIS, LEFT HAND: ICD-10-CM

## 2022-06-08 NOTE — ED STATDOCS - CHPI ED RADIATION
Stress test negative. Discharge ordered. Home with , has home oxygen, denies all needs. 6/8/22, 8:44 AM EDT    Patient goals/plan/ treatment preferences discussed by  and . Patient goals/plan/ treatment preferences reviewed with patient/ family. Patient/ family verbalize understanding of discharge plan and are in agreement with goal/plan/treatment preferences. Understanding was demonstrated using the teach back method. AVS provided by RN at time of discharge, which includes all necessary medical information pertaining to the patients current course of illness, treatment, post-discharge goals of care, and treatment preferences.      Services At/After Discharge: None       IMM Letter  Observation Status Letter date given[de-identified] 06/06/22  Observation Status Letter time given[de-identified] [id number]  Observation Status Letter given to Patient/Family/Significant other/Guardian/POA/by[de-identified] staff none

## 2024-06-17 NOTE — CONSULT NOTE ADULT - ASSESSMENT
Nonfasting labs.  Refilled medications (except Synthroid).  Will refill Synthroid after reviewing lab results.    F/U 6 months: Annual wellness visit.    Lab services: [address]  Hours: M-F 6:30a-6p, Sat 8a-12p  Phone: [phone], Option 1    71M with left trigger thumb    WBAT  Pain control  Rest, ice, and elevate affected extremity  Discussed possible need for surgical fixation  No planned orthopedic intervention at this time  Ortho stable for discharge  Follow up in office within 5 days of discharge with primary orthopedic surgeon. Information for Dr Pineda provided.  Will discuss with attending and advise if change